# Patient Record
Sex: MALE | Race: OTHER | HISPANIC OR LATINO | URBAN - METROPOLITAN AREA
[De-identification: names, ages, dates, MRNs, and addresses within clinical notes are randomized per-mention and may not be internally consistent; named-entity substitution may affect disease eponyms.]

---

## 2017-03-26 ENCOUNTER — EMERGENCY (EMERGENCY)
Facility: HOSPITAL | Age: 32
LOS: 1 days | Discharge: HOME | End: 2017-03-26
Attending: EMERGENCY MEDICINE
Payer: MEDICAID

## 2017-03-26 VITALS
WEIGHT: 145.06 LBS | HEART RATE: 63 BPM | SYSTOLIC BLOOD PRESSURE: 106 MMHG | DIASTOLIC BLOOD PRESSURE: 64 MMHG | HEIGHT: 66 IN | RESPIRATION RATE: 18 BRPM | TEMPERATURE: 99 F | OXYGEN SATURATION: 100 %

## 2017-03-26 VITALS
DIASTOLIC BLOOD PRESSURE: 63 MMHG | SYSTOLIC BLOOD PRESSURE: 109 MMHG | RESPIRATION RATE: 18 BRPM | TEMPERATURE: 99 F | OXYGEN SATURATION: 100 % | HEART RATE: 49 BPM

## 2017-03-26 DIAGNOSIS — R10.31 RIGHT LOWER QUADRANT PAIN: ICD-10-CM

## 2017-03-26 DIAGNOSIS — M54.5 LOW BACK PAIN: ICD-10-CM

## 2017-03-26 LAB
ABO RH CONFIRMATION: SIGNIFICANT CHANGE UP
ALBUMIN SERPL ELPH-MCNC: 4.6 G/DL — SIGNIFICANT CHANGE UP (ref 3.3–5.2)
ALP SERPL-CCNC: 100 U/L — SIGNIFICANT CHANGE UP (ref 40–120)
ALT FLD-CCNC: 24 U/L — SIGNIFICANT CHANGE UP
ANION GAP SERPL CALC-SCNC: 11 MMOL/L — SIGNIFICANT CHANGE UP (ref 5–17)
APPEARANCE UR: CLEAR — SIGNIFICANT CHANGE UP
APTT BLD: 32.2 SEC — SIGNIFICANT CHANGE UP (ref 27.5–37.4)
AST SERPL-CCNC: 28 U/L — SIGNIFICANT CHANGE UP
BASOPHILS # BLD AUTO: 0 K/UL — SIGNIFICANT CHANGE UP (ref 0–0.2)
BASOPHILS NFR BLD AUTO: 0.4 % — SIGNIFICANT CHANGE UP (ref 0–2)
BILIRUB SERPL-MCNC: 0.3 MG/DL — LOW (ref 0.4–2)
BILIRUB UR-MCNC: NEGATIVE — SIGNIFICANT CHANGE UP
BLD GP AB SCN SERPL QL: SIGNIFICANT CHANGE UP
BUN SERPL-MCNC: 19 MG/DL — SIGNIFICANT CHANGE UP (ref 8–20)
CALCIUM SERPL-MCNC: 8.9 MG/DL — SIGNIFICANT CHANGE UP (ref 8.6–10.2)
CHLORIDE SERPL-SCNC: 99 MMOL/L — SIGNIFICANT CHANGE UP (ref 98–107)
CO2 SERPL-SCNC: 26 MMOL/L — SIGNIFICANT CHANGE UP (ref 22–29)
COLOR SPEC: YELLOW — SIGNIFICANT CHANGE UP
CREAT SERPL-MCNC: 0.67 MG/DL — SIGNIFICANT CHANGE UP (ref 0.5–1.3)
DIFF PNL FLD: NEGATIVE — SIGNIFICANT CHANGE UP
EOSINOPHIL # BLD AUTO: 0.3 K/UL — SIGNIFICANT CHANGE UP (ref 0–0.5)
EOSINOPHIL NFR BLD AUTO: 2.9 % — SIGNIFICANT CHANGE UP (ref 0–5)
GLUCOSE SERPL-MCNC: 95 MG/DL — SIGNIFICANT CHANGE UP (ref 70–115)
GLUCOSE UR QL: NEGATIVE MG/DL — SIGNIFICANT CHANGE UP
HCT VFR BLD CALC: 42.6 % — SIGNIFICANT CHANGE UP (ref 42–52)
HGB BLD-MCNC: 14.5 G/DL — SIGNIFICANT CHANGE UP (ref 14–18)
INR BLD: 1 RATIO — SIGNIFICANT CHANGE UP (ref 0.88–1.16)
KETONES UR-MCNC: ABNORMAL
LEUKOCYTE ESTERASE UR-ACNC: NEGATIVE — SIGNIFICANT CHANGE UP
LIDOCAIN IGE QN: 23 U/L — SIGNIFICANT CHANGE UP (ref 22–51)
LYMPHOCYTES # BLD AUTO: 2.8 K/UL — SIGNIFICANT CHANGE UP (ref 1–4.8)
LYMPHOCYTES # BLD AUTO: 25.7 % — SIGNIFICANT CHANGE UP (ref 20–55)
MCHC RBC-ENTMCNC: 28 PG — SIGNIFICANT CHANGE UP (ref 27–31)
MCHC RBC-ENTMCNC: 34 G/DL — SIGNIFICANT CHANGE UP (ref 32–36)
MCV RBC AUTO: 82.2 FL — SIGNIFICANT CHANGE UP (ref 80–94)
MONOCYTES # BLD AUTO: 0.6 K/UL — SIGNIFICANT CHANGE UP (ref 0–0.8)
MONOCYTES NFR BLD AUTO: 5.1 % — SIGNIFICANT CHANGE UP (ref 3–10)
NEUTROPHILS # BLD AUTO: 7.3 K/UL — SIGNIFICANT CHANGE UP (ref 1.8–8)
NEUTROPHILS NFR BLD AUTO: 65.6 % — SIGNIFICANT CHANGE UP (ref 37–73)
NITRITE UR-MCNC: NEGATIVE — SIGNIFICANT CHANGE UP
PH UR: 5 — SIGNIFICANT CHANGE UP (ref 4.8–8)
PLATELET # BLD AUTO: 222 K/UL — SIGNIFICANT CHANGE UP (ref 150–400)
POTASSIUM SERPL-MCNC: 4.3 MMOL/L — SIGNIFICANT CHANGE UP (ref 3.5–5.3)
POTASSIUM SERPL-SCNC: 4.3 MMOL/L — SIGNIFICANT CHANGE UP (ref 3.5–5.3)
PROT SERPL-MCNC: 7.3 G/DL — SIGNIFICANT CHANGE UP (ref 6.6–8.7)
PROT UR-MCNC: NEGATIVE MG/DL — SIGNIFICANT CHANGE UP
PROTHROM AB SERPL-ACNC: 11 SEC — SIGNIFICANT CHANGE UP (ref 9.8–12.7)
RBC # BLD: 5.18 M/UL — SIGNIFICANT CHANGE UP (ref 4.6–6.2)
RBC # FLD: 12.9 % — SIGNIFICANT CHANGE UP (ref 11–15.6)
SODIUM SERPL-SCNC: 136 MMOL/L — SIGNIFICANT CHANGE UP (ref 135–145)
SP GR SPEC: 1.02 — SIGNIFICANT CHANGE UP (ref 1.01–1.02)
TYPE + AB SCN PNL BLD: SIGNIFICANT CHANGE UP
UROBILINOGEN FLD QL: NEGATIVE MG/DL — SIGNIFICANT CHANGE UP
WBC # BLD: 11.1 K/UL — HIGH (ref 4.8–10.8)
WBC # FLD AUTO: 11.1 K/UL — HIGH (ref 4.8–10.8)

## 2017-03-26 PROCEDURE — 99284 EMERGENCY DEPT VISIT MOD MDM: CPT

## 2017-03-26 PROCEDURE — 74177 CT ABD & PELVIS W/CONTRAST: CPT | Mod: 26

## 2017-03-26 RX ORDER — SODIUM CHLORIDE 9 MG/ML
1000 INJECTION INTRAMUSCULAR; INTRAVENOUS; SUBCUTANEOUS
Qty: 0 | Refills: 0 | Status: DISCONTINUED | OUTPATIENT
Start: 2017-03-26 | End: 2017-03-30

## 2017-03-26 RX ORDER — IBUPROFEN 200 MG
1 TABLET ORAL
Qty: 15 | Refills: 0 | OUTPATIENT
Start: 2017-03-26 | End: 2017-03-31

## 2017-03-26 RX ADMIN — SODIUM CHLORIDE 125 MILLILITER(S): 9 INJECTION INTRAMUSCULAR; INTRAVENOUS; SUBCUTANEOUS at 19:45

## 2017-03-26 NOTE — ED ADULT NURSE NOTE - OBJECTIVE STATEMENT
Assumed patient care at 1950.  Pt c/o R sided abdominal pain that radiates to right flank X 3 weeks.  Abdomen soft, nondistended.  No acute distress.

## 2017-03-26 NOTE — ED STATDOCS - NS ED MD SCRIBE ATTENDING SCRIBE SECTIONS
HISTORY OF PRESENT ILLNESS/HIV/PHYSICAL EXAM/DISPOSITION/REVIEW OF SYSTEMS/PAST MEDICAL/SURGICAL/SOCIAL HISTORY/VITAL SIGNS( Pullset)

## 2017-03-26 NOTE — ED ADULT TRIAGE NOTE - CHIEF COMPLAINT QUOTE
Patient arrived to ED today with c/o right sided abdominal pain and back pain.  Patient denies urinary problems.

## 2017-03-26 NOTE — ED STATDOCS - OBJECTIVE STATEMENT
30 y/o male presents to ED c/o right lumbosacral back pain x 3 months, increased in intensity x 3 days. Pt believed that his pain began to originate from his right kidney, so he began to drink loose leaf tea, with some relief. He also treated with Tylenol with minimal relief. Today, he reports that his pain originates in his right flank, throbbing in nature, radiating to his RLQ.   Denies fever, n/v/d, abd pain, testicular pain. No PMHx. NKDA. No further complaints at this time. 32 y/o male presents to ED c/o right lumbosacral back pain x 3 months, increased in intensity x 3 days. Pt believed that his pain began to originate from his right kidney, so he began to drink "rinonsan" tea, with some relief. He also treated with Tylenol with minimal relief. Today, he reports that his pain originates in his right flank, throbbing in nature, radiating to his RLQ.   Denies fever, n/v/d, abd pain, testicular pain. No PMHx. NKDA. No further complaints at this time.

## 2017-03-26 NOTE — ED STATDOCS - PROGRESS NOTE DETAILS
PA NOTE: Pt seen by intake physician and orders/plan reviewed. PT is a 30yo male with no significant pmhx presenting to ED with complaints of right flank pain x 3 weeks.   PE: GEN: Awake, alert,  NAD,  EYES: PERRL CARDIAC: Reg rate and rhythm, S1,S2, RRR  RESP: No distress noted. Lungs CTA bilaterally no wheeze, ronchi, rales. ABD: soft, supple, non-tender, no guarding. . NEURO: AOx3, no focal deficits   PLAN: PA NOTE: Pt seen by intake physician and orders/plan reviewed. PT is a 30yo male with no significant pmhx presenting to ED with complaints of right flank pain x 3 weeks. pt reports pain started in the back at first so he thought he just had general back pain but is not radiating to the front. pain is described as constant, pulsating pain. pt reports today pain got stronger than usual. pt took tylenol for symptoms, last dose yesterday. pt does not have  PMD. pt denies fever, chills, cp, sob, back injury, numbness or tinging of extremities, saddle anesthesia. NKDA  USED  PE: GEN: Awake, alert,  NAD,  EYES: anicteric CARDIAC: Reg rate and rhythm, S1,S2, RRR  RESP: No distress noted. Lungs CTA bilaterally no wheeze, ronchi, rales. ABD: soft, supple, non-tender, no guarding.+ R CVAT . MS: FROM BL LE. 5/5 STRENGTH BL LE. SENSATION GROSSLY INTACT. NEURO: AOx3, no focal deficits   PLAN:pt is a 30yo male with right flank pain. labs and ct pending. will re-evaluate. pt improved. labs, UA and CT reviewed. results reviewed with attending and with pt using . pt advised to follow up with [facility] clinic and take motrin for pain. pt verbalized understanding and agreement with plan and dx. will brad.c . discussed with attending.

## 2017-06-04 ENCOUNTER — EMERGENCY (EMERGENCY)
Facility: HOSPITAL | Age: 32
LOS: 1 days | Discharge: HOME | End: 2017-06-04
Attending: EMERGENCY MEDICINE
Payer: COMMERCIAL

## 2017-06-04 VITALS
DIASTOLIC BLOOD PRESSURE: 67 MMHG | HEIGHT: 65.75 IN | RESPIRATION RATE: 16 BRPM | OXYGEN SATURATION: 99 % | HEART RATE: 58 BPM | TEMPERATURE: 98 F | WEIGHT: 141.98 LBS | SYSTOLIC BLOOD PRESSURE: 115 MMHG

## 2017-06-04 VITALS
DIASTOLIC BLOOD PRESSURE: 69 MMHG | RESPIRATION RATE: 16 BRPM | SYSTOLIC BLOOD PRESSURE: 123 MMHG | HEART RATE: 61 BPM | OXYGEN SATURATION: 100 % | TEMPERATURE: 98 F

## 2017-06-04 DIAGNOSIS — S16.1XXA STRAIN OF MUSCLE, FASCIA AND TENDON AT NECK LEVEL, INITIAL ENCOUNTER: ICD-10-CM

## 2017-06-04 DIAGNOSIS — Z79.899 OTHER LONG TERM (CURRENT) DRUG THERAPY: ICD-10-CM
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
## 2017-06-04 DIAGNOSIS — M54.2 CERVICALGIA: ICD-10-CM
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
## 2017-06-04 DIAGNOSIS — M54.5 LOW BACK PAIN: ICD-10-CM

## 2017-06-04 PROCEDURE — 99284 EMERGENCY DEPT VISIT MOD MDM: CPT

## 2017-06-04 PROCEDURE — 71020: CPT | Mod: 26

## 2017-06-04 PROCEDURE — 73030 X-RAY EXAM OF SHOULDER: CPT | Mod: 26,LT

## 2017-06-04 PROCEDURE — 72100 X-RAY EXAM L-S SPINE 2/3 VWS: CPT | Mod: 26

## 2017-06-04 RX ORDER — IBUPROFEN 200 MG
400 TABLET ORAL ONCE
Qty: 0 | Refills: 0 | Status: COMPLETED | OUTPATIENT
Start: 2017-06-04 | End: 2017-06-04

## 2017-06-04 RX ADMIN — Medication 400 MILLIGRAM(S): at 17:15
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
## 2017-06-04 NOTE — ED ADULT NURSE NOTE - CHPI ED SYMPTOMS NEG
no disorientation/no crying/no decreased eating/drinking/no sleeping issues/no bruising/no fussiness/no dizziness/no laceration/no loss of consciousness/no headache/no neck tenderness
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
## 2018-07-01 ENCOUNTER — EMERGENCY (EMERGENCY)
Facility: HOSPITAL | Age: 33
LOS: 1 days | Discharge: HOME | End: 2018-07-01
Attending: EMERGENCY MEDICINE
Payer: MEDICAID

## 2018-07-01 VITALS
OXYGEN SATURATION: 98 % | RESPIRATION RATE: 18 BRPM | TEMPERATURE: 98 F | DIASTOLIC BLOOD PRESSURE: 74 MMHG | SYSTOLIC BLOOD PRESSURE: 120 MMHG | HEART RATE: 50 BPM

## 2018-07-01 VITALS — WEIGHT: 130.07 LBS | HEIGHT: 64 IN

## 2018-07-01 PROCEDURE — 90715 TDAP VACCINE 7 YRS/> IM: CPT

## 2018-07-01 PROCEDURE — 99283 EMERGENCY DEPT VISIT LOW MDM: CPT | Mod: 25

## 2018-07-01 PROCEDURE — 12011 RPR F/E/E/N/L/M 2.5 CM/<: CPT

## 2018-07-01 RX ORDER — TETANUS TOXOID, REDUCED DIPHTHERIA TOXOID AND ACELLULAR PERTUSSIS VACCINE, ADSORBED 5; 2.5; 8; 8; 2.5 [IU]/.5ML; [IU]/.5ML; UG/.5ML; UG/.5ML; UG/.5ML
0.5 SUSPENSION INTRAMUSCULAR ONCE
Qty: 0 | Refills: 0 | Status: COMPLETED | OUTPATIENT
Start: 2018-07-01 | End: 2018-07-01

## 2018-07-01 RX ADMIN — TETANUS TOXOID, REDUCED DIPHTHERIA TOXOID AND ACELLULAR PERTUSSIS VACCINE, ADSORBED 0.5 MILLILITER(S): 5; 2.5; 8; 8; 2.5 SUSPENSION INTRAMUSCULAR at 15:47

## 2018-07-01 NOTE — ED ADULT TRIAGE NOTE - CHIEF COMPLAINT QUOTE
Patient is awake and oriented times 4, patient arrives from home, ambulatory and complains of a cut near his eye

## 2018-07-01 NOTE — ED PROVIDER NOTE - ATTENDING CONTRIBUTION TO CARE
33 yo M presented to Ed with c/o laceration to right eyebrow s/p accidently being elbowed while playing soccer just pta. No HA or LOC. No N/V.  pe awake neuro nonfocal; skin  2cm lac  inferior rt eybrow; lac repair

## 2018-07-01 NOTE — ED PROVIDER NOTE - OBJECTIVE STATEMENT
33 yo M presented to Ed with c/o laceration to right eyebrow s/p accidently being elbowed while playing soccer just pta. No HA or LOC. No N/V. NO visual changes. No weakness or paresthesias. dT unknown

## 2021-11-29 NOTE — ED PROVIDER NOTE - CPE EDP MUSC NORM
Hemigard Intro: Due to skin fragility and wound tension, it was decided to use HEMIGARD adhesive retention suture devices to permit a linear closure. The skin was cleaned and dried for a 6cm distance away from the wound. Excessive hair, if present, was removed to allow for adhesion. - - -

## 2022-09-28 NOTE — ED ADULT TRIAGE NOTE - CCCP TRG CHIEF CMPLNT
lacerations Adjacent Tissue Transfer Text: The defect edges were debeveled with a #15 scalpel blade.  Given the location of the defect and the proximity to free margins an adjacent tissue transfer was deemed most appropriate.  Using a sterile surgical marker, an appropriate flap was drawn incorporating the defect and placing the expected incisions within the relaxed skin tension lines where possible.    The area thus outlined was incised deep to adipose tissue with a #15 scalpel blade.  The skin margins were undermined to an appropriate distance in all directions utilizing iris scissors.

## 2022-12-11 ENCOUNTER — EMERGENCY (EMERGENCY)
Facility: HOSPITAL | Age: 37
LOS: 1 days | Discharge: HOME | End: 2022-12-11
Attending: EMERGENCY MEDICINE
Payer: SELF-PAY

## 2022-12-11 VITALS
HEART RATE: 51 BPM | SYSTOLIC BLOOD PRESSURE: 118 MMHG | RESPIRATION RATE: 18 BRPM | DIASTOLIC BLOOD PRESSURE: 50 MMHG | WEIGHT: 145.06 LBS | HEIGHT: 65.75 IN | OXYGEN SATURATION: 98 % | TEMPERATURE: 98 F

## 2022-12-11 PROCEDURE — 99283 EMERGENCY DEPT VISIT LOW MDM: CPT

## 2022-12-11 PROCEDURE — 73590 X-RAY EXAM OF LOWER LEG: CPT | Mod: 26,RT

## 2022-12-11 PROCEDURE — 73590 X-RAY EXAM OF LOWER LEG: CPT

## 2022-12-11 RX ORDER — IBUPROFEN 200 MG
600 TABLET ORAL ONCE
Refills: 0 | Status: DISCONTINUED | OUTPATIENT
Start: 2022-12-11 | End: 2022-12-18

## 2022-12-11 NOTE — ED PROVIDER NOTE - ATTENDING APP SHARED VISIT CONTRIBUTION OF CARE
The patient seen and examined    Leg Pain    I, Cyril Astorga, performed the initial face to face bedside interview with this patient regarding history of present illness, review of symptoms and relevant past medical, social and family history.  I completed an independent physical examination.  I was the initial provider who evaluated this patient. I have signed out the follow up of any pending tests (i.e. labs, radiological studies) to the ACP.  I have communicated the patient’s plan of care and disposition with the ACP.

## 2022-12-11 NOTE — ED PROVIDER NOTE - IV ALTEPLASE EXCL REL HIDDEN
[facility] [facility]  Phone: [phone]  Fax: [phone]    Jazlyn Swenson MD        August 4, 2021     Patient: Champ Mendoza   YOB: 2003   Date of Visit: 8/4/2021       To Whom It May Concern:    Brace should limit range of motion to 90 degrees of ABduction, 90 degrees of forward flexion, and 45 degrees of external rotation. If you have any questions or concerns, please don't hesitate to call.     Sincerely,        Jazlyn Swenson MD show

## 2022-12-11 NOTE — ED PROVIDER NOTE - PATIENT PORTAL LINK FT
You can access the FollowMyHealth Patient Portal offered by [facility] by registering at the following website: [email or web address]. By joining [employer]’s FollowMyHealth portal, you will also be able to view your health information using other applications (apps) compatible with our system.

## 2022-12-11 NOTE — ED PROVIDER NOTE - NSFOLLOWUPINSTRUCTIONS_ED_ALL_ED_FT
- Ibuprofen 600mg every 6 hours as needed for pain.  - Acetaminophen 650mg every 6 hours as needed for pain.   - Please bring all documentation from your ED visit to any related future follow up appointment.  - Please call to schedule follow up appointment with your primary care physician within 24-48 hours.  - Please seek immediate medical attention for any new/worsening, signs/symptoms, or concerns.    Feel better!    - Ibuprofeno 600mg cada 6 horas según necesidad para el dolor.  - Acetaminofén 650 mg cada 6 horas según sea necesario para el dolor.  - Traiga toda la documentación de rehman visita al ED a cualquier medardo de seguimiento futura relacionada.  - Llame para programar lima medardo de seguimiento con rehman médico de atención primaria dentro de las 24 a 48 horas.  - Por favor, busque atención médica inmediata para cualquier nuevo/empeoramiento, signos/síntomas o inquietudes.    ¡Sentirse mejor!      Periostitis tibial    Shin Splints    Front view of the lower leg showing the tibia, muscles, and tendons.   La periostitis tibial es lima afección que se siente en el hueso que se encuentra en la parte de adelante de la pierna (tibia) o en los músculos a cada lado del hueso. Se presenta cuando las actividades físicas causan la inflamación de los músculos, los tendones y la membrana de tejido delgada que recubre la tibia. Puede deberse a la práctica de deportes u otros ejercicios intensos.      ¿Cuáles son las causas?    Esta afección puede ser causada por lo siguiente:  •El uso excesivo de los músculos.      •Actividades repetitivas.      •Pies planos o keith rígidos.      Las actividades que podrían contribuir a la periostitis tibial incluyen lo siguiente:  •Aumentar de manera repentina el tiempo que destina a realizar un ejercicio.      •Comenzar lima nueva actividad intensa.      •Correr cristel arriba o largas distancias.      •Practicar deportes en los que se realizan movimientos repentinos de arranque y detención.      •No hacer un precalentamiento antes de la actividad.      •Usar calzado tom o desgastado.        ¿Cuáles son los signos o síntomas?    El síntoma principal de esta afección es un dolor que se siente:  •En la parte delantera inferior de la pierna.      •En los músculos a cada lado de la tibia.      •Al hacer ejercicio o estar en reposo.        ¿Cómo se diagnostica?    Esta afección se puede diagnosticar en función de lo siguiente:  •Un examen físico.      •Isaias síntomas.      •Observación de cómo camina o corre.      •Radiografías u otras pruebas de diagnóstico por imágenes. Estos estudios pueden realizarse para descartar otros problemas.        ¿Cómo se trata?    El tratamiento de esta afección depende de rehman edad, antecedentes, olnnie general y la intensidad del dolor. La mayoría de los casos de periostitis tibial se pueden tratar mediante lima o más de las siguientes opciones:  •Hacer reposo.      •Reducir la duración y la intensidad del ejercicio.      •Suspender la actividad que causa el dolor tibial.      •Ayaka medicamentos para controlar la inflamación.      •Aplicar hielo en la breana afectada, masajearla, elongarla y fortalecerla.      •Usar calzado con talones rígidos, amortiguación y buen soporte para el arco.      Para el dolor tibial grave, el médico puede recomendarle que use muletas para evitar soportar el peso corporal en las piernas.      Siga estas instrucciones en rehman casa:    Medicamentos     •Use los medicamentos de venta ange y los recetados solamente jeff se lo haya indicado el médico.      • No conduzca ni use maquinaria pesada mientras hortencia analgésicos recetados.        Control del dolor, la rigidez y la hinchazón       Bag of ice on a towel on the skin.       A heating pad for use on the affected area.   •Si se lo indican, aplique hielo sobre la breana dolorida. Aplicar hielo puede ayudar a aliviar el dolor y la hinchazón.  •Ponga el hielo en lima bolsa plástica.      •Coloque lima toalla entre la piel y la bolsa.      •Aplique el hielo wiley 20 minutos, 2 o 3 veces por día.      •Si se lo indican, aplique calor en la breana dolorida antes de hacer ejercicios de estiramiento o jeff se lo haya indicado el médico. El calor puede ayudar a relajar los músculos. Use la lucas de calor que el médico le recomiende, jeff lima compresa de calor húmedo o lima almohadilla térmica.  •Coloque lima toalla entre la piel y la lucas de calor.      •Aplique calor wiley 20 a 30 minutos.      •Retire la lucas de calor si la piel se pone de color chacon brillante. [place] es especialmente importante si no puede sentir dolor, calor o frío. Puede correr un riesgo mayor de sufrir quemaduras.        •Masajee, elongue y fortalezca la breana afectada jeff se lo haya indicado el médico.      •Use mangas o medias de compresión jeff se lo haya indicado el médico.      •Mientras esté sentado o acostado, levante (eleve) las piernas por encima del nivel del corazón.      Actividad     •Descanse todo lo que sea necesario. Reanude isaias actividades gradualmente jeff se lo haya indicado el médico.      •Cuando comience a hacer ejercicio nuevamente, empiece con ejercicios que no requieran soportar peso, jeff el ciclismo o la natación.      •Deje de correr si el dolor regresa.      •Entre en calor correctamente antes de ejercitarse.      •Corra sobre lima superficie nik y bastante firme.      •Cambie gradualmente la intensidad de un ejercicio.      •Si aumenta la distancia que corre, agregue solo el 5 % o el 10 % al recorrido cada semana. [place] significa que si corre 5 millas (8 kilómetros) esta semana, solo debe aumentar ¼ o ½ belem (0.4 o 0.8 kilómetros) la semana siguiente.      Instrucciones generales     •Use calzado con talones rígidos, amortiguación y buen soporte para el arco.      •Cambie el calzado deportivo cada 6 meses o cada 350 a 450 millas.      •Concurra a todas las visitas de seguimiento jeff se lo haya indicado el médico. [place] es importante.        Comuníquese con un médico si:    •Los síntomas continúan o empeoran, incluso después del tratamiento.      •La ubicación, la intensidad o el tipo de dolor cambian con el tiempo.      •Tiene lima hinchazón en la parte inferior de la pierna que empeora.      •La tibia está enrojecida y caliente al tacto.        Solicite ayuda de inmediato si:    •Siente dolor intenso.      •Presenta dificultad para caminar.        Resumen    •La periostitis tibial se presenta cuando las actividades físicas causan la inflamación de los músculos, los tendones y la membrana de tejido delgada que recubre la tibia.      •Los tratamientos pueden incluir ayaka medicamentos, hacer reposo y aplicar hielo.      •Reanude isaias actividades gradualmente jeff se lo haya indicado el médico.      •Asegúrese de saber cuáles son los síntomas por los que debería comunicarse con rehman médico.      Esta información no tiene jeff fin reemplazar el consejo del médico. Asegúrese de hacerle al médico cualquier pregunta que tenga. - Ibuprofen 600mg every 6 hours as needed for pain.  - Acetaminophen 650mg every 6 hours as needed for pain.   - Rest.   - Please bring all documentation from your ED visit to any related future follow up appointment.  - Please call to schedule follow up appointment with your primary care physician within 24-48 hours.  - Please seek immediate medical attention for any new/worsening, signs/symptoms, or concerns.    Feel better!    - Ibuprofeno 600mg cada 6 horas según necesidad para el dolor.  - Acetaminofén 650 mg cada 6 horas según sea necesario para el dolor.  - Descansar.  - Traiga toda la documentación de rehman visita al ED a cualquier medardo de seguimiento futura relacionada.  - Llame para programar lima medardo de seguimiento con rehman médico de atención primaria dentro de las 24 a 48 horas.  - Por favor, busque atención médica inmediata para cualquier nuevo/empeoramiento, signos/síntomas o inquietudes.    ¡Sentirse mejor!      Periostitis tibial    Shin Splints    Front view of the lower leg showing the tibia, muscles, and tendons.   La periostitis tibial es lima afección que se siente en el hueso que se encuentra en la parte de adelante de la pierna (tibia) o en los músculos a cada lado del hueso. Se presenta cuando las actividades físicas causan la inflamación de los músculos, los tendones y la membrana de tejido delgada que recubre la tibia. Puede deberse a la práctica de deportes u otros ejercicios intensos.      ¿Cuáles son las causas?    Esta afección puede ser causada por lo siguiente:  •El uso excesivo de los músculos.      •Actividades repetitivas.      •Pies planos o keith rígidos.      Las actividades que podrían contribuir a la periostitis tibial incluyen lo siguiente:  •Aumentar de manera repentina el tiempo que destina a realizar un ejercicio.      •Comenzar lima nueva actividad intensa.      •Correr cristel arriba o largas distancias.      •Practicar deportes en los que se realizan movimientos repentinos de arranque y detención.      •No hacer un precalentamiento antes de la actividad.      •Usar calzado tom o desgastado.        ¿Cuáles son los signos o síntomas?    El síntoma principal de esta afección es un dolor que se siente:  •En la parte delantera inferior de la pierna.      •En los músculos a cada lado de la tibia.      •Al hacer ejercicio o estar en reposo.        ¿Cómo se diagnostica?    Esta afección se puede diagnosticar en función de lo siguiente:  •Un examen físico.      •Isaias síntomas.      •Observación de cómo camina o corre.      •Radiografías u otras pruebas de diagnóstico por imágenes. Estos estudios pueden realizarse para descartar otros problemas.        ¿Cómo se trata?    El tratamiento de esta afección depende de rehman edad, antecedentes, lonnie general y la intensidad del dolor. La mayoría de los casos de periostitis tibial se pueden tratar mediante lima o más de las siguientes opciones:  •Hacer reposo.      •Reducir la duración y la intensidad del ejercicio.      •Suspender la actividad que causa el dolor tibial.      •Ayaka medicamentos para controlar la inflamación.      •Aplicar hielo en la breana afectada, masajearla, elongarla y fortalecerla.      •Usar calzado con talones rígidos, amortiguación y buen soporte para el arco.      Para el dolor tibial grave, el médico puede recomendarle que use muletas para evitar soportar el peso corporal en las piernas.      Siga estas instrucciones en rehman casa:    Medicamentos     •Use los medicamentos de venta ange y los recetados solamente jeff se lo haya indicado el médico.      • No conduzca ni use maquinaria pesada mientras hortencia analgésicos recetados.        Control del dolor, la rigidez y la hinchazón       Bag of ice on a towel on the skin.       A heating pad for use on the affected area.   •Si se lo indican, aplique hielo sobre la breana dolorida. Aplicar hielo puede ayudar a aliviar el dolor y la hinchazón.  •Ponga el hielo en lima bolsa plástica.      •Coloque lima toalla entre la piel y la bolsa.      •Aplique el hielo wiley 20 minutos, 2 o 3 veces por día.      •Si se lo indican, aplique calor en la breana dolorida antes de hacer ejercicios de estiramiento o jeff se lo haya indicado el médico. El calor puede ayudar a relajar los músculos. Use la lucas de calor que el médico le recomiende, jeff lima compresa de calor húmedo o lima almohadilla térmica.  •Coloque lima toalla entre la piel y la lucas de calor.      •Aplique calor wiley 20 a 30 minutos.      •Retire la lucas de calor si la piel se pone de color chacon brillante. [place] es especialmente importante si no puede sentir dolor, calor o frío. Puede correr un riesgo mayor de sufrir quemaduras.        •Masajee, elongue y fortalezca la breana afectada jeff se lo haya indicado el médico.      •Use mangas o medias de compresión jeff se lo haya indicado el médico.      •Mientras esté sentado o acostado, levante (eleve) las piernas por encima del nivel del corazón.      Actividad     •Descanse todo lo que sea necesario. Reanude isaias actividades gradualmente jeff se lo haya indicado el médico.      •Cuando comience a hacer ejercicio nuevamente, empiece con ejercicios que no requieran soportar peso, jeff el ciclismo o la natación.      •Deje de correr si el dolor regresa.      •Entre en calor correctamente antes de ejercitarse.      •Corra sobre lima superficie nik y bastante firme.      •Cambie gradualmente la intensidad de un ejercicio.      •Si aumenta la distancia que corre, agregue solo el 5 % o el 10 % al recorrido cada semana. [place] significa que si corre 5 millas (8 kilómetros) esta semana, solo debe aumentar ¼ o ½ belem (0.4 o 0.8 kilómetros) la semana siguiente.      Instrucciones generales     •Use calzado con talones rígidos, amortiguación y buen soporte para el arco.      •Cambie el calzado deportivo cada 6 meses o cada 350 a 450 millas.      •Concurra a todas las visitas de seguimiento jeff se lo haya indicado el médico. [place] es importante.        Comuníquese con un médico si:    •Los síntomas continúan o empeoran, incluso después del tratamiento.      •La ubicación, la intensidad o el tipo de dolor cambian con el tiempo.      •Tiene lima hinchazón en la parte inferior de la pierna que empeora.      •La tibia está enrojecida y caliente al tacto.        Solicite ayuda de inmediato si:    •Siente dolor intenso.      •Presenta dificultad para caminar.        Resumen    •La periostitis tibial se presenta cuando las actividades físicas causan la inflamación de los músculos, los tendones y la membrana de tejido delgada que recubre la tibia.      •Los tratamientos pueden incluir ayaka medicamentos, hacer reposo y aplicar hielo.      •Reanude isaias actividades gradualmente jeff se lo haya indicado el médico.      •Asegúrese de saber cuáles son los síntomas por los que debería comunicarse con rehman médico.      Esta información no tiene jeff fin reemplazar el consejo del médico. Asegúrese de hacerle al médico cualquier pregunta que tenga.

## 2022-12-11 NOTE — ED PROVIDER NOTE - CLINICAL SUMMARY MEDICAL DECISION MAKING FREE TEXT BOX
36 yo male no PMHx presents to ED c/o right shin pain x2 months, likely with shin splints from playing soccer. Patient requesting XR. Rest, analgesics. 38 yo male no PMHx presents to ED c/o right shin pain x2 months, likely with shin splints from playing soccer. Patient requesting XR (negative). Rest, analgesics.

## 2022-12-11 NOTE — ED PROVIDER NOTE - NS ED ATTENDING STATEMENT MOD
This was a shared visit with the ROSEY. I reviewed and verified the documentation and independently performed the documented:

## 2022-12-11 NOTE — ED PROVIDER NOTE - OBJECTIVE STATEMENT
38 yo male no PMHx presents to ED c/o right shin pain x2 months. Described as sharp. Plays soccer. Has been able to bear weight. No further complaints at this time.

## 2023-10-19 ENCOUNTER — EMERGENCY (EMERGENCY)
Facility: HOSPITAL | Age: 38
LOS: 1 days | Discharge: HOME | End: 2023-10-19
Attending: EMERGENCY MEDICINE
Payer: SELF-PAY

## 2023-10-19 VITALS
SYSTOLIC BLOOD PRESSURE: 113 MMHG | RESPIRATION RATE: 18 BRPM | DIASTOLIC BLOOD PRESSURE: 69 MMHG | WEIGHT: 151.46 LBS | OXYGEN SATURATION: 59 % | HEART RATE: 61 BPM | TEMPERATURE: 98 F

## 2023-10-19 VITALS — OXYGEN SATURATION: 99 %

## 2023-10-19 PROCEDURE — 73090 X-RAY EXAM OF FOREARM: CPT | Mod: 26,LT

## 2023-10-19 PROCEDURE — 73110 X-RAY EXAM OF WRIST: CPT

## 2023-10-19 PROCEDURE — 99283 EMERGENCY DEPT VISIT LOW MDM: CPT

## 2023-10-19 PROCEDURE — 73110 X-RAY EXAM OF WRIST: CPT | Mod: 26,LT

## 2023-10-19 PROCEDURE — 73090 X-RAY EXAM OF FOREARM: CPT

## 2023-10-19 PROCEDURE — T1013: CPT

## 2023-10-19 PROCEDURE — 99284 EMERGENCY DEPT VISIT MOD MDM: CPT

## 2023-10-19 RX ORDER — ACETAMINOPHEN 500 MG
650 TABLET ORAL ONCE
Refills: 0 | Status: COMPLETED | OUTPATIENT
Start: 2023-10-19 | End: 2023-10-19

## 2023-10-19 RX ADMIN — Medication 650 MILLIGRAM(S): at 17:05

## 2023-10-19 NOTE — ED PROVIDER NOTE - PATIENT PORTAL LINK FT
You can access the FollowMyHealth Patient Portal offered by [facility] by registering at the following website: [email or web address]. By joining [employer]’s FollowMyHealth portal, you will also be able to view your health information using other applications (apps) compatible with our system.

## 2023-10-19 NOTE — ED PROVIDER NOTE - CARE PROVIDER_API CALL
Avinash Good  Orthopaedic Surgery  [address] [place] [id number]  Phone: [phone]  Fax: [phone]  Follow Up Time:

## 2023-10-19 NOTE — ED PROVIDER NOTE - NSFOLLOWUPINSTRUCTIONS_ED_ALL_ED_FT
- Please follow-up with your primary care doctor in the next 5-7 days.  Please call tomorrow for an appointment.  If you cannot follow-up with your primary care doctor please return to the ED for any urgent issues.  - You were given a copy of the tests performed today.  Please bring the results with you and review them with your primary care doctor.  - If you have any worsening of symptoms or any other concerns please return to the ED immediately.  - Please continue taking your home medications as directed.   - Follow up with the orthopedist within 5-7 days.     - Maryam un seguimiento con rehman médico de atención primaria en los próximos 5 a 7 paco. Por favor llame mañana para lima medardo. Si no puede realizar un seguimiento con rehman médico de atención primaria, regrese al servicio de urgencias si tiene algún problema urgente.  - Le entregaron lima copia de las pruebas realizadas hoy. Traiga los resultados con usted y revíselos con rehman médico de atención primaria.  - Si isaias síntomas empeoran o tiene alguna otra inquietud, regrese al servicio de urgencias de inmediato.  - Continúe tomando isaias medicamentos caseros según las indicaciones.  - Seguimiento con el ortopedista dentro de 5-7 días.

## 2023-10-19 NOTE — ED PROVIDER NOTE - ATTENDING APP SHARED VISIT CONTRIBUTION OF CARE
I, Priyank Vera, have personally performed a face to face diagnostic evaluation on this patient. I have reviewed the ROSEY note and agree with the history, exam and plan of care, except as noted.    38-year-old male presents with left forearm pain when he was holding a tree with the roots resting on his forearm.  Patient report pain with movement.  No fever.  On exam mild tenderness to distal forearm, no ecchymosis, no erythema, no swelling.  X-ray showed no acute fracture, soft tissue air however no signs of infection. Tylenol given for pain.  Splint applied for comfort.  Outpatient follow-up recommended.

## 2023-10-19 NOTE — ED PROVIDER NOTE - CLINICAL SUMMARY MEDICAL DECISION MAKING FREE TEXT BOX
39 yo male with no pmhx presents with left forearm pain x4 days. xrays performed   neurovascularly intact, no fnd's 39 yo male with no pmhx presents with left forearm pain x4 days. xrays performed   neurovascularly intact, no fnd's.  pt made aware of xray findings. no overlying injury, break in the skin/laceration. compartments soft/compressible. no evidence of cellulitic processes to the arm. ortho f/u explained. strict return precautions explained.

## 2023-10-19 NOTE — ED PROVIDER NOTE - PHYSICAL EXAMINATION
Gen: No acute distress, non toxic  HEENT: Mucous membranes moist, pink conjunctivae, EOMI  CV: RRR, nl s1/s2.  Resp: CTAB, normal rate and effort  GI: Abdomen soft, NT, ND. No rebound, no guarding  Neuro: A&O x 3, moving all 4 extremities  MSK: +ttp over the left dorsal surface of the forearm, over the mid-radial area. FROM UE b/l. no visualized or palpable deformities. compartments soft/compressible.   Skin: No rashes. intact and perfused. cap refill <2sec  Vascular: radial and ulnar pulses 2+ b/l

## 2023-10-23 NOTE — ED POST DISCHARGE NOTE - RESULT SUMMARY
Air in the soft tissue noted on x-ray.  I spoke with the patient and he states he was aware of these findings at discharge and is following up with Dr. Levy this Wednesday.  He denies any increasing pain, no redness, no fever, no chills.

## 2023-10-24 NOTE — ED PROVIDER NOTE - PMH
No pertinent past medical history    No pertinent past medical history Pt advised of below. He will discuss this with his allergists office.
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
## 2023-10-25 DIAGNOSIS — M79.632 PAIN IN LEFT FOREARM: ICD-10-CM

## 2024-02-03 ENCOUNTER — EMERGENCY (EMERGENCY)
Facility: HOSPITAL | Age: 39
LOS: 1 days | Discharge: HOME | End: 2024-02-03
Attending: EMERGENCY MEDICINE
Payer: MEDICAID

## 2024-02-03 VITALS
OXYGEN SATURATION: 97 % | DIASTOLIC BLOOD PRESSURE: 72 MMHG | HEIGHT: 64 IN | HEART RATE: 77 BPM | TEMPERATURE: 99 F | WEIGHT: 175.05 LBS | SYSTOLIC BLOOD PRESSURE: 122 MMHG | RESPIRATION RATE: 18 BRPM

## 2024-02-03 PROCEDURE — 96372 THER/PROPH/DIAG INJ SC/IM: CPT

## 2024-02-03 PROCEDURE — 73030 X-RAY EXAM OF SHOULDER: CPT

## 2024-02-03 PROCEDURE — 99283 EMERGENCY DEPT VISIT LOW MDM: CPT

## 2024-02-03 PROCEDURE — T1013: CPT

## 2024-02-03 PROCEDURE — 73030 X-RAY EXAM OF SHOULDER: CPT | Mod: 26,50

## 2024-02-03 PROCEDURE — 99284 EMERGENCY DEPT VISIT MOD MDM: CPT

## 2024-02-03 RX ORDER — KETOROLAC TROMETHAMINE 30 MG/ML
30 SYRINGE (ML) INJECTION ONCE
Refills: 0 | Status: DISCONTINUED | OUTPATIENT
Start: 2024-02-03 | End: 2024-02-03

## 2024-02-03 RX ADMIN — Medication 30 MILLIGRAM(S): at 15:45

## 2024-02-03 NOTE — ED ADULT TRIAGE NOTE - CHIEF COMPLAINT QUOTE
pt c/o bilateral shoulder pain started a month ago, No injury, no fall  A&ox3, resp wnl, states he does heavy lifting at work

## 2024-02-03 NOTE — ED PROVIDER NOTE - PATIENT PORTAL LINK FT
You can access the FollowMyHealth Patient Portal offered by [facility] by registering at the following website: [email or web address]. By joining [facility]’s FollowMyHealth portal, you will also be able to view your health information using other applications (apps) compatible with our system.

## 2024-02-03 NOTE — ED PROVIDER NOTE - CLINICAL SUMMARY MEDICAL DECISION MAKING FREE TEXT BOX
28 years old male no past medical history presents emergency room complaining of the bilateral shoulder pain on and off for past month. pain is on anterior shoulder over the muscle area worse by the movement as well. without any numbness or tingling in the hands. he is right-hand dominant. did not take any pain medications. he is a . denies any other symptoms include fever chills, cough, abdominal pain or chest pain   explained the patient likely is muscles injury will obtain the x-ray he should follow-up in orthopedic outpatient he agreed   x-ray shoulder and Tylenol

## 2024-02-03 NOTE — ED PROVIDER NOTE - OBJECTIVE STATEMENT
28 years old male no past medical history presents emergency room complaining of the bilateral shoulder pain on and off for past month. pain is on anterior shoulder over the muscle area worse by the movement as well. without any numbness or tingling in the hands. he is right-hand dominant. did not take any pain medications. he is a . denies any other symptoms include fever chills, cough, abdominal pain or chest pain

## 2024-02-03 NOTE — ED PROVIDER NOTE - NSFOLLOWUPINSTRUCTIONS_ED_ALL_ED_FT
constantine los analgésicos recetados  : Seguimiento con médico ortopédico  - Esguince    Un esguince es un estiramiento o desgarro en bhanu de los tejidos (ligamentos) resistentes y similares a fibras del cuerpo. [place] es causado por lima lesión en el área, jeff un mecanismo de torsión. Los síntomas incluyen dolor, hinchazón o hematomas. Descanse ailin área wiley los próximos días y reanude lentamente la actividad cuando la tolere. El hielo puede ayudar con la hinchazón y el dolor.    BUSQUE ATENCIÓN MÉDICA INMEDIATA SI TIENE ALGUNO DE LOS SIGUIENTES SÍNTOMAS: empeoramiento del dolor, incapacidad para  ailin parte del cuerpo, entumecimiento u hormigueo.

## 2024-02-03 NOTE — ED PROVIDER NOTE - CARE PROVIDER_API CALL
Avinash Good  Orthopaedic Surgery  [address] [place] [id number]  Phone: [phone]  Fax: [phone]  Follow Up Time: 4-6 Days

## 2024-02-03 NOTE — ED PROVIDER NOTE - PHYSICAL EXAMINATION
Const: AOX3 nontoxic appearing, no apparent respiratory or physical distress. Stable gait   HEENT: NC/AT. Moist mucous membranes.  Eyes: GRABIEL. EOMI  Neck: Soft and supple. Full ROM without pain.  Cardiac: Regular rate and regular rhythm. +S1/S2. radial  pulses 2+ and symmetric. No LE edema.  Resp: Speaking in full sentences. No evidence of respiratory distress.   Skin: No rashes, abrasions or lacerations.   MSK bilateral shoulders contour within normal. pain on abduction and external rotation bilaterally. anterior medial aspect of the  bilateral shoulder muscle tenderness to palpation noted. handgrip grossly intact  Neuro: Awake, alert & oriented x 3. Moves all extremities symmetrically.

## 2024-02-06 PROBLEM — Z00.00 ENCOUNTER FOR PREVENTIVE HEALTH EXAMINATION: Status: ACTIVE | Noted: 2024-02-06

## 2024-02-08 ENCOUNTER — APPOINTMENT (OUTPATIENT)
Dept: ORTHOPEDIC SURGERY | Facility: CLINIC | Age: 39
End: 2024-02-08
Payer: SELF-PAY

## 2024-02-08 VITALS
SYSTOLIC BLOOD PRESSURE: 125 MMHG | DIASTOLIC BLOOD PRESSURE: 89 MMHG | WEIGHT: 150 LBS | HEIGHT: 64 IN | BODY MASS INDEX: 25.61 KG/M2 | HEART RATE: 68 BPM

## 2024-02-08 DIAGNOSIS — M75.51 BURSITIS OF RIGHT SHOULDER: ICD-10-CM

## 2024-02-08 DIAGNOSIS — M75.52 BURSITIS OF RIGHT SHOULDER: ICD-10-CM

## 2024-02-08 PROCEDURE — 99203 OFFICE O/P NEW LOW 30 MIN: CPT

## 2024-02-08 RX ORDER — METHYLPREDNISOLONE 4 MG/1
4 TABLET ORAL
Qty: 1 | Refills: 0 | Status: ACTIVE | COMMUNITY
Start: 2024-02-08 | End: 1900-01-01

## 2024-02-08 NOTE — REASON FOR VISIT
[Initial Visit] : an initial visit for [[employer] ] : provided by [employer]   [FreeTextEntry2] : bilateral shoulder pain [Interpreters_IDNumber] : [id number] [Interpreters_FullName] : Anitha [TWNoteComboBox1] : [place]

## 2024-02-08 NOTE — PHYSICAL EXAM
[de-identified] : CONSTITUTIONAL: Patient is a very pleasant individual who is well-nourished and appears stated age. PSYCHIATRIC: Alert and oriented times three and in no apparent distress, and participates with orthopedic evaluation well. HEAD: Atraumatic and nonsyndromic in appearance. EENT: No thyromegaly, EOMI. RESPIRATORY: Respiratory rate is regular, not dyspneic on examination. LYMPHATICS: There is no cervical or axillary lymphadenopathy. INTEGUMENTARY: Skin is clean, dry, and intact about the bilateral upper extremities and cervical spine. VASCULAR: There is brisk capillary refill about the bilateral upper extremities and radial pulses are 2/4.  Cervical Spine Exam:  Shoulder Abduction (C5): 5/5 B/L Biceps (C6): 5/5 B/L Wrist Extension (C6): 5/5 B/L Triceps (C7): 5/5 B/L Wrist Flexion (C7): 5/5 B/L Finger Adduction/Abduction (C8/T1): 5/5 B/L Sensation:  SILT C5-T1 bilateral  Reflexes: 2+ reflexes Bicep/Tricep/Quadriceps/Achilles   Special test: Positive impingement signs with flexion abduction and external rotation, positive Hawking and Neer sign No tenderness along AC joint bilateral Negative speeds and Yergason's bilateral [de-identified] : Bilateral shoulder x-rays performed on 2/3/2024 in [facility] PACS demonstrates no significant fractures dislocations subacromial space well-maintained no AC joint arthrosis no signs of calcific tendinitis otherwise normal x-ray

## 2024-02-08 NOTE — HISTORY OF PRESENT ILLNESS
[de-identified] : Chief Complaint: Bilateral shoulder pain   History of Present Illness: 38-year-old male presenting today for initial evaluation for severe bilateral shoulder pain.  He states that about 3 months ago he developed left shoulder pain aching in nature isolated to the anterior part of the acromion and then deep within the shoulder was worse with activity he subsequently developed right shoulder pain in the same location the same quality same exacerbation.  I was past 2 months he states that the pain will worsen on 1 side of the other it is worsening with activity overhead activities specifically he is tried over-the-counter anti-inflammatories no relief.  He recently presented to the emergency room at [facility] on 2/3/2024 he was given some oral anti-inflammatories and had bilateral shoulder x-rays which was negative for any pathology.  He is here today for management.  He states that this is causing him significant issues especially with sleeping and difficulty working during the day.  He denies any neck pain he denies any radicular symptoms fairly isolated to the shoulder.  He also denies any weakness mainly pain   Past medical history, past surgical history, medications, allergies, social history, and family history are as documented in our records today.  Notable items include: None   Review of Systems: I have reviewed the patient's documented Review of Systems data today, I concur with this documentation.

## 2024-02-08 NOTE — ASSESSMENT
[FreeTextEntry1] : 38-year-old male with bilateral subacromial bursitis  Today Mahendra and I reviewed his physical exam findings, signs symptoms as well as his x-rays today in the office.  I believe that he is suffering from bilateral subacromial bursitis for several months now.  This causing him significant discomfort and difficulty sleeping and difficulty working.  He would like a more definitive treatment option because he does not have insurance and is here today for a self-pay option.

## 2024-02-08 NOTE — DISCUSSION/SUMMARY
[de-identified] : Medrol Dosepak He will take over-the-counter anti-inflammatories as needed once the Medrol Dosepak is completed Ho modalities such as stretching, heat, ice, rest limiting overhead activities If his symptoms fail to improve I will see him back otherwise he will follow-up on a as needed basis.

## 2024-03-17 ENCOUNTER — EMERGENCY (EMERGENCY)
Facility: HOSPITAL | Age: 39
LOS: 1 days | Discharge: HOME | End: 2024-03-17
Attending: EMERGENCY MEDICINE
Payer: MEDICAID

## 2024-03-17 VITALS
HEART RATE: 52 BPM | HEIGHT: 64 IN | RESPIRATION RATE: 18 BRPM | DIASTOLIC BLOOD PRESSURE: 70 MMHG | SYSTOLIC BLOOD PRESSURE: 127 MMHG | OXYGEN SATURATION: 97 % | WEIGHT: 158.73 LBS | TEMPERATURE: 98 F

## 2024-03-17 VITALS — HEART RATE: 62 BPM

## 2024-03-17 LAB
ALBUMIN SERPL ELPH-MCNC: 4.1 G/DL — SIGNIFICANT CHANGE UP (ref 3.3–5.2)
ALP SERPL-CCNC: 105 U/L — SIGNIFICANT CHANGE UP (ref 40–120)
ALT FLD-CCNC: 21 U/L — SIGNIFICANT CHANGE UP
ANION GAP SERPL CALC-SCNC: 12 MMOL/L — SIGNIFICANT CHANGE UP (ref 5–17)
AST SERPL-CCNC: 25 U/L — SIGNIFICANT CHANGE UP
BASOPHILS # BLD AUTO: 0.06 K/UL — SIGNIFICANT CHANGE UP (ref 0–0.2)
BASOPHILS NFR BLD AUTO: 0.7 % — SIGNIFICANT CHANGE UP (ref 0–2)
BILIRUB SERPL-MCNC: 0.3 MG/DL — LOW (ref 0.4–2)
BUN SERPL-MCNC: 15.9 MG/DL — SIGNIFICANT CHANGE UP (ref 8–20)
CALCIUM SERPL-MCNC: 8.4 MG/DL — SIGNIFICANT CHANGE UP (ref 8.4–10.5)
CHLORIDE SERPL-SCNC: 104 MMOL/L — SIGNIFICANT CHANGE UP (ref 96–108)
CO2 SERPL-SCNC: 22 MMOL/L — SIGNIFICANT CHANGE UP (ref 22–29)
CREAT SERPL-MCNC: 0.71 MG/DL — SIGNIFICANT CHANGE UP (ref 0.5–1.3)
EGFR: 122 ML/MIN/1.73M2 — SIGNIFICANT CHANGE UP
EOSINOPHIL # BLD AUTO: 0.21 K/UL — SIGNIFICANT CHANGE UP (ref 0–0.5)
EOSINOPHIL NFR BLD AUTO: 2.3 % — SIGNIFICANT CHANGE UP (ref 0–6)
GLUCOSE SERPL-MCNC: 94 MG/DL — SIGNIFICANT CHANGE UP (ref 70–99)
HCT VFR BLD CALC: 41.2 % — SIGNIFICANT CHANGE UP (ref 39–50)
HGB BLD-MCNC: 14 G/DL — SIGNIFICANT CHANGE UP (ref 13–17)
IMM GRANULOCYTES NFR BLD AUTO: 0.3 % — SIGNIFICANT CHANGE UP (ref 0–0.9)
LYMPHOCYTES # BLD AUTO: 3.96 K/UL — HIGH (ref 1–3.3)
LYMPHOCYTES # BLD AUTO: 44 % — SIGNIFICANT CHANGE UP (ref 13–44)
MCHC RBC-ENTMCNC: 27.7 PG — SIGNIFICANT CHANGE UP (ref 27–34)
MCHC RBC-ENTMCNC: 34 GM/DL — SIGNIFICANT CHANGE UP (ref 32–36)
MCV RBC AUTO: 81.6 FL — SIGNIFICANT CHANGE UP (ref 80–100)
MONOCYTES # BLD AUTO: 0.71 K/UL — SIGNIFICANT CHANGE UP (ref 0–0.9)
MONOCYTES NFR BLD AUTO: 7.9 % — SIGNIFICANT CHANGE UP (ref 2–14)
NEUTROPHILS # BLD AUTO: 4.03 K/UL — SIGNIFICANT CHANGE UP (ref 1.8–7.4)
NEUTROPHILS NFR BLD AUTO: 44.8 % — SIGNIFICANT CHANGE UP (ref 43–77)
PLATELET # BLD AUTO: 246 K/UL — SIGNIFICANT CHANGE UP (ref 150–400)
POTASSIUM SERPL-MCNC: 4 MMOL/L — SIGNIFICANT CHANGE UP (ref 3.5–5.3)
POTASSIUM SERPL-SCNC: 4 MMOL/L — SIGNIFICANT CHANGE UP (ref 3.5–5.3)
PROT SERPL-MCNC: 6.6 G/DL — SIGNIFICANT CHANGE UP (ref 6.6–8.7)
RBC # BLD: 5.05 M/UL — SIGNIFICANT CHANGE UP (ref 4.2–5.8)
RBC # FLD: 12.3 % — SIGNIFICANT CHANGE UP (ref 10.3–14.5)
SODIUM SERPL-SCNC: 138 MMOL/L — SIGNIFICANT CHANGE UP (ref 135–145)
TROPONIN T, HIGH SENSITIVITY RESULT: <6 NG/L — SIGNIFICANT CHANGE UP (ref 0–51)
WBC # BLD: 9 K/UL — SIGNIFICANT CHANGE UP (ref 3.8–10.5)
WBC # FLD AUTO: 9 K/UL — SIGNIFICANT CHANGE UP (ref 3.8–10.5)

## 2024-03-17 PROCEDURE — 93005 ELECTROCARDIOGRAM TRACING: CPT

## 2024-03-17 PROCEDURE — 99285 EMERGENCY DEPT VISIT HI MDM: CPT | Mod: 25

## 2024-03-17 PROCEDURE — 80053 COMPREHEN METABOLIC PANEL: CPT

## 2024-03-17 PROCEDURE — 99285 EMERGENCY DEPT VISIT HI MDM: CPT

## 2024-03-17 PROCEDURE — 93010 ELECTROCARDIOGRAM REPORT: CPT

## 2024-03-17 PROCEDURE — 36415 COLL VENOUS BLD VENIPUNCTURE: CPT

## 2024-03-17 PROCEDURE — 84484 ASSAY OF TROPONIN QUANT: CPT

## 2024-03-17 PROCEDURE — 71045 X-RAY EXAM CHEST 1 VIEW: CPT

## 2024-03-17 PROCEDURE — 85025 COMPLETE CBC W/AUTO DIFF WBC: CPT

## 2024-03-17 PROCEDURE — 96372 THER/PROPH/DIAG INJ SC/IM: CPT

## 2024-03-17 PROCEDURE — T1013: CPT

## 2024-03-17 PROCEDURE — 71045 X-RAY EXAM CHEST 1 VIEW: CPT | Mod: 26

## 2024-03-17 RX ORDER — ACETAMINOPHEN 500 MG
650 TABLET ORAL ONCE
Refills: 0 | Status: COMPLETED | OUTPATIENT
Start: 2024-03-17 | End: 2024-03-17

## 2024-03-17 RX ORDER — KETOROLAC TROMETHAMINE 30 MG/ML
15 SYRINGE (ML) INJECTION ONCE
Refills: 0 | Status: DISCONTINUED | OUTPATIENT
Start: 2024-03-17 | End: 2024-03-17

## 2024-03-17 RX ORDER — LIDOCAINE 4 G/100G
1 CREAM TOPICAL ONCE
Refills: 0 | Status: COMPLETED | OUTPATIENT
Start: 2024-03-17 | End: 2024-03-17

## 2024-03-17 RX ADMIN — Medication 650 MILLIGRAM(S): at 02:36

## 2024-03-17 RX ADMIN — Medication 15 MILLIGRAM(S): at 02:36

## 2024-03-17 RX ADMIN — LIDOCAINE 1 PATCH: 4 CREAM TOPICAL at 02:36

## 2024-03-17 NOTE — ED ADULT NURSE NOTE - OBJECTIVE STATEMENT
Patient presents with c/o non radiating, right sided chest pain x1 week. Pt A&Ox4, denies sob at this time.  Pt appears resting on stretcher in no apparent distress, respirations even and unlabored with lungs CTA, negative JVD/diaphoresis.

## 2024-03-17 NOTE — ED PROVIDER NOTE - PHYSICAL EXAMINATION
Gen: no acute distress  Head: normocephalic, atraumatic  Lung: CTAB, no respiratory distress, no wheezing, rales, rhonchi  CV: normal s1/s2, rrr, chest wall mildly ttp no stepoffs no clicking no obvious deformities   Abd: soft, non-tender, non-distended  MSK: No edema, no visible deformities, full range of motion in all 4 extremities  Neuro: No focal neurologic deficits  Skin: No rash, no ecchymoses

## 2024-03-17 NOTE — ED PROVIDER NOTE - PROGRESS NOTE DETAILS
JK - CXR, troponin, labs WNL. Pain controlled. Tolerating PO. Ready for DC with close PCP follow up. Well appearing.

## 2024-03-17 NOTE — ED PROVIDER NOTE - OBJECTIVE STATEMENT
Patient is a 39 yo male with no PMHx presenting with CP for 1 week. Patient states he has had 1 week of dull intermittent substernal CP. Denies falls, trauma, LOC, FH cardiac disease, any PMHx, allergies, surgeries. Denies fevers, chills, dizziness, lightheadedness, dysphagia, dysarthria, diplopia, photophobia, syncope, cough, congestion, abdominal pain, neck pain, back pain, diarrhea, dysuria, hematuria, hematochezia, hematemesis, n/v, recent travel, sick contacts, leg swelling.    : Ho

## 2024-03-17 NOTE — ED PROVIDER NOTE - PATIENT PORTAL LINK FT
You can access the FollowMyHealth Patient Portal offered by [facility] by registering at the following website: [email or web address]. By joining [facility]’s FollowMyHealth portal, you will also be able to view your health information using other applications (apps) compatible with our system.

## 2024-03-17 NOTE — ED PROVIDER NOTE - ATTENDING CONTRIBUTION TO CARE
38-year-old male no past medical history presents with 1 week of substernal right-sided chest pain x 1 week.  Chest pain is reproducible.  Pain worse with movement.  Patient found to have sinus bradycardia on EKG. Lab values do not require emergent intervention. trop < 6. As interpreted by undersigned physician, chest xray demonstrates no acute pathology.  patient will have sinus bradycardia down to low 40s but improve when he wakes up.  No hypotension.   Chest pain likely musculoskeletal in nature.  Toradol given. Patient will need outpatient follow-up.

## 2024-03-17 NOTE — ED PROVIDER NOTE - CLINICAL SUMMARY MEDICAL DECISION MAKING FREE TEXT BOX
Patient is a 37 yo male with no PMHx presenting with CP for 1 week. Patient states he has had 1 week of dull intermittent substernal CP. VSS, well appearing, lungs ctab.    Will treat likely msk pain vs. costochondritis, check labs, r.o electrolyte abnormalities, troponin ekg to assess for acs, cxr to r.o pna vs. ptx, continue to monitor.

## 2025-01-27 NOTE — ED ADULT TRIAGE NOTE - TEMPERATURE IN FAHRENHEIT (DEGREES F)
Case Closed, pt. has been admitted to inpatient as of 1/27/25. Pt. was at [facility] prior to the admission.  
98

## 2025-02-03 NOTE — ED ADULT NURSE NOTE - CAS ELECT INFOMATION PROVIDED
panel stable.  Mild renal insufficiency creatinine 1.08, GFR 48.  Overall stable.  Slightly increased glucose 128, likely due to nonfasting lab status  Magnesium 1.8 which is within normal range.  Continue with same dosage.  Cholesterol 162 which is 12 points higher.  LDL 69 also about 6 points higher.  Overall still at goal.  Continue with low-cholesterol diet and meds.
Noted thank you.  Please order A1c lab which can be completed in the future.
DC instructions